# Patient Record
Sex: FEMALE | Race: WHITE | NOT HISPANIC OR LATINO | URBAN - NONMETROPOLITAN AREA
[De-identification: names, ages, dates, MRNs, and addresses within clinical notes are randomized per-mention and may not be internally consistent; named-entity substitution may affect disease eponyms.]

---

## 2019-08-12 ENCOUNTER — OFFICE VISIT (OUTPATIENT)
Dept: OBSTETRICS AND GYNECOLOGY | Facility: CLINIC | Age: 19
End: 2019-08-12

## 2019-08-12 VITALS
WEIGHT: 262 LBS | BODY MASS INDEX: 39.71 KG/M2 | SYSTOLIC BLOOD PRESSURE: 140 MMHG | HEIGHT: 68 IN | DIASTOLIC BLOOD PRESSURE: 80 MMHG

## 2019-08-12 DIAGNOSIS — Z83.3 FAMILY HISTORY OF DIABETES MELLITUS: ICD-10-CM

## 2019-08-12 DIAGNOSIS — N91.2 AMENORRHEA: Primary | ICD-10-CM

## 2019-08-12 DIAGNOSIS — L68.0 FEMALE HIRSUTISM: ICD-10-CM

## 2019-08-12 DIAGNOSIS — R63.5 WEIGHT GAIN: ICD-10-CM

## 2019-08-12 PROCEDURE — 99203 OFFICE O/P NEW LOW 30 MIN: CPT | Performed by: PHYSICIAN ASSISTANT

## 2019-08-12 NOTE — PROGRESS NOTES
Subjective   Chief Complaint   Patient presents with   • Menstrual Problem     No menses x 7 years   • Contraception     Would like to get birth control to help with having a periods       Veronica Gómez is a 18 y.o. year old new patient  presenting to be seen for complaints of amenorrhea and she is desiring to start birth control.  The patient is sexually active and states she has been consistent using condoms.  She reports menarche at age 10 or 11.  She never had regular menses.  After her first cycle she would skip several months before her next cycle sometimes up to 6 months in between her periods for the first 2 years.  She reports at this time she has not had a menstrual period in 7 years.  She has never had amenorrhea evaluated in the past.  She reports issues with hirsutism mainly her chin and jaws for the past 2 years.  She reports that she shaves her chin daily.  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  He does not have any significant issues with acne.  Reports there are no other women in her family that have had amenorrhea or hirsutism      History reviewed. No pertinent past medical history.   No current outpatient medications on file.   No Known Allergies   Past Surgical History:   Procedure Laterality Date   • NO PAST SURGERIES        Social History     Socioeconomic History   • Marital status: Single     Spouse name: Not on file   • Number of children: Not on file   • Years of education: Not on file   • Highest education level: Not on file   Tobacco Use   • Smoking status: Never Smoker   • Smokeless tobacco: Never Used   Substance and Sexual Activity   • Alcohol use: No     Frequency: Never   • Drug use: No   • Sexual activity: Yes     Partners: Male     Birth control/protection: Condom      Family History   Problem Relation Age of Onset   • Clotting disorder Father    • Diabetes Father    • Coronary artery disease Father    •  "Hypertension Father    • Hyperlipidemia Father    • No Known Problems Mother        Review of Systems   Constitutional:        Weight gain   Gastrointestinal: Negative.    Genitourinary: Positive for menstrual problem. Negative for difficulty urinating, dysuria, pelvic pain and vaginal discharge.   All other systems reviewed and are negative.          Objective   /80   Ht 172.7 cm (68\")   Wt 119 kg (262 lb)   Breastfeeding? No   BMI 39.84 kg/m²     Physical Exam   Constitutional: She is cooperative. No distress.   obese   Eyes: Conjunctivae, EOM and lids are normal.   Neck: No thyroid mass and no thyromegaly present.   Cardiovascular: Normal rate, regular rhythm and normal heart sounds.   Pulmonary/Chest: Effort normal and breath sounds normal.   Abdominal: Soft. Normal appearance and bowel sounds are normal. There is no hepatosplenomegaly. There is no tenderness. There is no rigidity and no guarding.   Genitourinary:   Genitourinary Comments: deferred   Neurological: She is alert.   Skin: Skin is warm and dry.   Moderate dark coarse hair chin and jawline  Moderate acanthosis nigricans posterior and sides of neck    Psychiatric: She has a normal mood and affect. Her behavior is normal.            Assessment and Plan  Veronica was seen today for menstrual problem and contraception.    Diagnoses and all orders for this visit:    Amenorrhea  -     Testosterone (Free & Total), LC / MS  -     Insulin, Total; Future  -     17-Hydroxyprogesterone  -     Comprehensive Metabolic Panel  -     DHEA-Sulfate  -     Estradiol  -     Follicle Stimulating Hormone  -     Hemoglobin A1c  -     Lipid Panel  -     Luteinizing Hormone  -     Prolactin  -     T4, Free  -     TSH  -     HCG, B-subunit, Quantitative ([facility] Only); Future    Female hirsutism  -     Testosterone (Free & Total), LC / MS  -     Insulin, Total; Future  -     17-Hydroxyprogesterone  -     Comprehensive Metabolic Panel  -     DHEA-Sulfate  -     Lipid " Panel  -     T4, Free  -     TSH    Weight gain  -     Testosterone (Free & Total), LC / MS  -     Insulin, Total; Future  -     17-Hydroxyprogesterone  -     Comprehensive Metabolic Panel  -     DHEA-Sulfate  -     Hemoglobin A1c  -     Lipid Panel  -     T4, Free  -     TSH    Family history of diabetes mellitus  -     Insulin, Total; Future  -     Comprehensive Metabolic Panel  -     Hemoglobin A1c  -     Lipid Panel      Patient Instructions   Will  Check labs today and RTO for pelvic ultrasound.  Pending results will discuss management and contraception options.             This note was electronically signed.    Megha Iqbal PA-C   August 12, 2019

## 2019-08-12 NOTE — PATIENT INSTRUCTIONS
Will  Check labs today and RTO for pelvic ultrasound.  Pending results will discuss management and contraception options.

## 2019-08-14 LAB
17OHP SERPL-MCNC: 56 NG/DL
ALBUMIN SERPL-MCNC: 4.7 G/DL (ref 3.5–5.2)
ALBUMIN/GLOB SERPL: 2.1 G/DL
ALP SERPL-CCNC: 51 U/L (ref 39–117)
ALT SERPL-CCNC: 23 U/L (ref 1–33)
AST SERPL-CCNC: 17 U/L (ref 1–32)
BILIRUB SERPL-MCNC: 0.3 MG/DL (ref 0.2–1.2)
BUN SERPL-MCNC: 12 MG/DL (ref 6–20)
BUN/CREAT SERPL: 13.6 (ref 7–25)
CALCIUM SERPL-MCNC: 9.6 MG/DL (ref 8.6–10.5)
CHLORIDE SERPL-SCNC: 103 MMOL/L (ref 98–107)
CHOLEST SERPL-MCNC: 195 MG/DL (ref 0–200)
CO2 SERPL-SCNC: 24.6 MMOL/L (ref 22–29)
CREAT SERPL-MCNC: 0.88 MG/DL (ref 0.57–1)
DHEA-S SERPL-MCNC: 152.8 UG/DL (ref 110–433.2)
ESTRADIOL SERPL-MCNC: 30.9 PG/ML
FSH SERPL-ACNC: 5.5 MIU/ML
GLOBULIN SER CALC-MCNC: 2.2 GM/DL
GLUCOSE SERPL-MCNC: 111 MG/DL (ref 65–99)
HBA1C MFR BLD: 6.2 % (ref 4.8–5.6)
HDLC SERPL-MCNC: 37 MG/DL (ref 40–60)
LDLC SERPL CALC-MCNC: 115 MG/DL (ref 0–100)
LH SERPL-ACNC: 8.5 MIU/ML
POTASSIUM SERPL-SCNC: 4.4 MMOL/L (ref 3.5–5.2)
PROLACTIN SERPL-MCNC: 16.3 NG/ML (ref 4.8–23.3)
PROT SERPL-MCNC: 6.9 G/DL (ref 6–8.5)
SODIUM SERPL-SCNC: 142 MMOL/L (ref 136–145)
T4 FREE SERPL-MCNC: 1.15 NG/DL (ref 0.93–1.7)
TESTOST FREE SERPL-MCNC: 2.6 PG/ML
TESTOST SERPL-MCNC: 46 NG/DL (ref 10–55)
TRIGL SERPL-MCNC: 217 MG/DL (ref 0–150)
TSH SERPL DL<=0.005 MIU/L-ACNC: 4.27 MIU/ML (ref 0.27–4.2)
VLDLC SERPL CALC-MCNC: 43.4 MG/DL

## 2019-08-17 ENCOUNTER — RESULTS ENCOUNTER (OUTPATIENT)
Dept: OBSTETRICS AND GYNECOLOGY | Facility: CLINIC | Age: 19
End: 2019-08-17

## 2019-08-17 DIAGNOSIS — Z83.3 FAMILY HISTORY OF DIABETES MELLITUS: ICD-10-CM

## 2019-08-17 DIAGNOSIS — L68.0 FEMALE HIRSUTISM: ICD-10-CM

## 2019-08-17 DIAGNOSIS — R63.5 WEIGHT GAIN: ICD-10-CM

## 2019-08-17 DIAGNOSIS — N91.2 AMENORRHEA: ICD-10-CM

## 2019-08-22 ENCOUNTER — OFFICE VISIT (OUTPATIENT)
Dept: OBSTETRICS AND GYNECOLOGY | Facility: CLINIC | Age: 19
End: 2019-08-22

## 2019-08-22 VITALS
WEIGHT: 258.4 LBS | SYSTOLIC BLOOD PRESSURE: 120 MMHG | BODY MASS INDEX: 39.16 KG/M2 | DIASTOLIC BLOOD PRESSURE: 82 MMHG | HEIGHT: 68 IN

## 2019-08-22 DIAGNOSIS — E28.2 POLYCYSTIC OVARY SYNDROME: ICD-10-CM

## 2019-08-22 DIAGNOSIS — N91.2 AMENORRHEA: Primary | ICD-10-CM

## 2019-08-22 DIAGNOSIS — E78.5 ELEVATED LIPIDS: ICD-10-CM

## 2019-08-22 DIAGNOSIS — R73.03 PREDIABETES: ICD-10-CM

## 2019-08-22 PROCEDURE — 99214 OFFICE O/P EST MOD 30 MIN: CPT | Performed by: PHYSICIAN ASSISTANT

## 2019-08-22 RX ORDER — MEDROXYPROGESTERONE ACETATE 10 MG/1
10 TABLET ORAL DAILY
Qty: 12 TABLET | Refills: 0 | Status: SHIPPED | OUTPATIENT
Start: 2019-08-22

## 2019-08-22 NOTE — PROGRESS NOTES
Subjective   Chief Complaint   Patient presents with   • Follow-up     TVS for amenorrhea, discuss labs       Veronica Gómez is a 18 y.o. year old  presenting to be seen for follow-up amenorrhea.  She was seen for her initial visit about 2 weeks ago and at that time had not had a menstrual period for 7 years.  She is sexually active and using condoms consistently.  Her menarche was at age 11-12 and she never had regular periods in her adolescence sometimes skipping periods for 5 to 6 months at a time.  She had complaints of weight gain and hirsutism as well.  Patient has had labs done which are reviewed with her and her mother today.  Her androgenic screening labs reveal a normal total testosterone at 46 and normal free testosterone at 2.6, 17 hydroxyprogesterone is normal, DHEAS is normal.  FSH is normal at 5.5 and LH is 8.5.  Prolactin is normal at 16.3.  Her TSH is minimally elevated at 4.270 and she has a normal free T4.  Lipids reveal moderately elevated triglycerides, slightly elevated LDL and low HDL.  Hemoglobin A1c is elevated at 6.2.  Comprehensive metabolic panel is normal except for glucose of 111 and the patient was fasting.  Pelvic ultrasound done on the patient today is also reviewed and it notes a normal-appearing anteverted uterus, her endometrium is 8.7 mm, bilateral ovaries have multiple small follicles and there is no free fluid.  Ultrasound images are reviewed by myself.  Of new significance today the patient's mother relates that the patient's biological father has had a blood clotting disorder that has caused him to have multiple embolus is in his heart and possible pulmonary embolus is.  The father also has a relative with another blood clotting disorder however the mother is not sure of the exact diagnoses of these issues.      History reviewed. No pertinent past medical history.     Current Outpatient Medications:   •  medroxyPROGESTERone (PROVERA) 10 MG tablet, Take 1 tablet by mouth  "Daily., Disp: 12 tablet, Rfl: 0  •  metFORMIN (GLUCOPHAGE) 500 MG tablet, Take 1 tablet by mouth with evening meal for one week, then Take 1 tablet twice a day with meals, Disp: 60 tablet, Rfl: 2   No Known Allergies   Past Surgical History:   Procedure Laterality Date   • NO PAST SURGERIES        Social History     Socioeconomic History   • Marital status: Single     Spouse name: Not on file   • Number of children: Not on file   • Years of education: Not on file   • Highest education level: Not on file   Tobacco Use   • Smoking status: Never Smoker   • Smokeless tobacco: Never Used   Substance and Sexual Activity   • Alcohol use: No     Frequency: Never   • Drug use: No   • Sexual activity: Yes     Partners: Male     Birth control/protection: Condom      Family History   Problem Relation Age of Onset   • Clotting disorder Father    • Diabetes Father    • Coronary artery disease Father    • Hypertension Father    • Hyperlipidemia Father    • No Known Problems Mother        Review of Systems   Constitutional:        Weight gain   Gastrointestinal: Negative.    Genitourinary: Positive for menstrual problem. Negative for difficulty urinating, dysuria and vaginal discharge.           Objective   /82   Ht 172.7 cm (68\")   Wt 117 kg (258 lb 6.4 oz)   Breastfeeding? No   BMI 39.29 kg/m²     Physical Exam         Assessment and Plan  Veronica was seen today for follow-up.    Diagnoses and all orders for this visit:    Amenorrhea  -     Cancel: US Pelvis Complete  -     US Non-OB Transvaginal    Prediabetes    Polycystic ovary syndrome    Elevated lipids    Other orders  -     medroxyPROGESTERone (PROVERA) 10 MG tablet; Take 1 tablet by mouth Daily.  -     metFORMIN (GLUCOPHAGE) 500 MG tablet; Take 1 tablet by mouth with evening meal for one week, then Take 1 tablet twice a day with meals      Patient Instructions   Given the new information today regarding the family history of coagulopathy the patient is advised " that at this point she should not be on any type of estrogen birth control for cycle regulation.  We will do a round of Provera for 12 days to try to get a withdrawal bleed.  We also discussed the significance of the elevated hemoglobin A1c and discussed the option of starting metformin at this time and the patient and mother would like to do this.  In addition to the metformin patient is also strongly encouraged to eliminate sugar, simple carbohydrates, and starches in her diet and try to implement a regular exercise regimen.  The patient will be going back to [place] today to [facility].  Patient does not anticipate that she will be back home very often.  She is advised that she should seek to establish care with a primary care practitioner in [place] to further follow-up on her prediabetes, she also will need repeat  thyroid testing in about 10 to 12 weeks.  She is advised with the family history of clotting disorders that she should try to find out the name of her father's issue and then she would need to be screened for coagulopathies prior to any estrogen containing birth control.             This note was electronically signed.    Megha Iqbal PA-C   August 23, 2019

## 2019-08-23 NOTE — PATIENT INSTRUCTIONS
Given the new information today regarding the family history of coagulopathy the patient is advised that at this point she should not be on any type of estrogen birth control for cycle regulation.  We will do a round of Provera for 12 days to try to get a withdrawal bleed.  We also discussed the significance of the elevated hemoglobin A1c and discussed the option of starting metformin at this time and the patient and mother would like to do this.  In addition to the metformin patient is also strongly encouraged to eliminate sugar, simple carbohydrates, and starches in her diet and try to implement a regular exercise regimen.  The patient will be going back to [place] today to [facility].  Patient does not anticipate that she will be back home very often.  She is advised that she should seek to establish care with a primary care practitioner in [place] to further follow-up on her prediabetes, she also will need repeat  thyroid testing in about 10 to 12 weeks.  She is advised with the family history of clotting disorders that she should try to find out the name of her father's issue and then she would need to be screened for coagulopathies prior to any estrogen containing birth control.

## 2020-01-09 NOTE — PROGRESS NOTES
Patient: Veronica Gómez    YOB: 2000    Date: 01/13/2020    Primary Care Provider: Paulette Layne APRN    Chief Complaint   Patient presents with   • Cyst     pilonidal cyst        SUBJECTIVE:    History of present illness:  Patient is here for evaluation of a pilonidal cyst. She complains of a history of pilonidal cysts. She states she is not having any symptoms currently but would like to discuss having a cystectomy. She states she has had multiple episodes of drainage and pain with her last episode being 3 weeks ago.     She has had multiple previous episodes of pilonidal inflammation that have spontaneously drained or gotten better with antibiotics.    The following portions of the patient's history were reviewed and updated as appropriate: allergies, current medications, past family history, past medical history, past social history, past surgical history and problem list.      Review of Systems   Constitutional: Negative for chills, fever and unexpected weight change.   HENT: Negative for hearing loss, trouble swallowing and voice change.    Eyes: Negative for visual disturbance.   Respiratory: Negative for apnea, cough, chest tightness, shortness of breath and wheezing.    Cardiovascular: Negative for chest pain, palpitations and leg swelling.   Gastrointestinal: Negative for abdominal distention, abdominal pain, anal bleeding, blood in stool, constipation, diarrhea, nausea, rectal pain and vomiting.   Endocrine: Negative for cold intolerance and heat intolerance.   Genitourinary: Negative for difficulty urinating, dysuria and flank pain.   Musculoskeletal: Negative for back pain and gait problem.   Skin: Negative for color change, rash and wound.   Neurological: Negative for dizziness, syncope, speech difficulty, weakness, light-headedness, numbness and headaches.   Hematological: Negative for adenopathy. Does not bruise/bleed easily.   Psychiatric/Behavioral: Negative for confusion. The  "patient is not nervous/anxious.        Allergies:  No Known Allergies    Medications:    Current Outpatient Medications:   •  medroxyPROGESTERone (PROVERA) 10 MG tablet, Take 1 tablet by mouth Daily., Disp: 12 tablet, Rfl: 0  •  metFORMIN (GLUCOPHAGE) 500 MG tablet, Take 1 tablet by mouth with evening meal for one week, then Take 1 tablet twice a day with meals, Disp: 60 tablet, Rfl: 2    History:  History reviewed. No pertinent past medical history.    Past Surgical History:   Procedure Laterality Date   • NO PAST SURGERIES         Family History   Problem Relation Age of Onset   • Clotting disorder Father    • Diabetes Father    • Coronary artery disease Father    • Hypertension Father    • Hyperlipidemia Father    • No Known Problems Mother        Social History     Tobacco Use   • Smoking status: Never Smoker   • Smokeless tobacco: Never Used   Substance Use Topics   • Alcohol use: No     Frequency: Never   • Drug use: No        OBJECTIVE:    Vital Signs:   Vitals:    01/13/20 0928   BP: 122/84   Temp: 97.7 °F (36.5 °C)   Weight: 117 kg (258 lb)   Height: 172.7 cm (67.99\")       Physical Exam:   General Appearance:    Alert, cooperative, in no acute distress   Head:    Normocephalic, without obvious abnormality, atraumatic   Eyes:            Lids and lashes normal, conjunctivae and sclerae normal, no   icterus, no pallor, corneas clear, PERRLA   Ears:    Ears appear intact with no abnormalities noted   Throat:   No oral lesions, no thrush, oral mucosa moist   Neck:   No adenopathy, supple, trachea midline, no thyromegaly, no   carotid bruit, no JVD   Lungs:     Clear to auscultation,respirations regular, even and                  unlabored    Heart:    Regular rhythm and normal rate, normal S1 and S2, no            murmur, no gallop, no rub, no click   Chest Wall:    No abnormalities observed   Abdomen:     Normal bowel sounds, no masses, no organomegaly, soft        non-tender, non-distended, no guarding, no " rebound                tenderness   Extremities:   Moves all extremities well, no edema, no cyanosis, no             redness   Pulses:   Pulses palpable and equal bilaterally   Skin:   No bleeding, bruising or rash evidence of midline pilonidal sinuses but no evidence of abscess,    Lymph nodes:   No palpable adenopathy   Neurologic:   Cranial nerves 2 - 12 grossly intact, sensation intact, DTR       present and equal bilaterally     Results Review:   I reviewed the patient's new clinical results.  I reviewed the patient's new imaging results and agree with the interpretation.  I reviewed the patient's other test results and agree with the interpretation    Review of Systems was reviewed and confirmed as accurate as documented by the MA.    ASSESSMENT/PLAN:    1. Pilonidal cyst        I did have a detailed and extensive discussion with the patient and her mother in the office today, she did will need to undergo future pilonidal cystectomy but she wants to wait until she is home for spring break.  She will come back and see me prior to this.    I discussed the patients findings and my recommendations with patient and family        Electronically signed by Yoni Palacios MD  01/21/20      .    Portions of this note has been scribed for Yoni Palacios MD by Chanelle Singh. 1/21/2020  4:13 PM

## 2020-01-13 ENCOUNTER — OFFICE VISIT (OUTPATIENT)
Dept: SURGERY | Facility: CLINIC | Age: 20
End: 2020-01-13

## 2020-01-13 VITALS
BODY MASS INDEX: 39.1 KG/M2 | TEMPERATURE: 97.7 F | HEIGHT: 68 IN | WEIGHT: 258 LBS | SYSTOLIC BLOOD PRESSURE: 122 MMHG | DIASTOLIC BLOOD PRESSURE: 84 MMHG

## 2020-01-13 DIAGNOSIS — L05.91 PILONIDAL CYST: Primary | ICD-10-CM

## 2020-01-13 PROCEDURE — 99243 OFF/OP CNSLTJ NEW/EST LOW 30: CPT | Performed by: SURGERY

## 2020-05-01 NOTE — PROGRESS NOTES
"Patient: Veronica Gómez    YOB: 2000    Date: 05/04/2020    Primary Care Provider: Paulette Layne APRN    Chief Complaint   Patient presents with   • Follow-up     pilonidal cyst.       SUBJECTIVE:    History of present illness:  Patient has a history of pilonidal cyst, she has had multiple episodes of spontaneous rupture with drainage.  Patient is here for follow-up.  Patient stated \"it is all healed-up right now.\"  Patient denies pain, she denies swelling and she denies redness or drainage at the site.    The patient has had multiple previous attacks of drainage from pilonidal cyst abscess, currently she is asymptomatic.    The following portions of the patient's history were reviewed and updated as appropriate: allergies, current medications, past family history, past medical history, past social history, past surgical history and problem list.      Review of Systems   Constitutional: Negative for chills, fever and unexpected weight change.   HENT: Negative for hearing loss, trouble swallowing and voice change.    Eyes: Negative for visual disturbance.   Respiratory: Negative for apnea, cough, chest tightness, shortness of breath and wheezing.    Cardiovascular: Negative for chest pain, palpitations and leg swelling.   Gastrointestinal: Negative for abdominal distention, abdominal pain, anal bleeding, blood in stool, constipation, diarrhea, nausea, rectal pain and vomiting.   Endocrine: Negative for cold intolerance and heat intolerance.   Genitourinary: Negative for difficulty urinating, dysuria and flank pain.   Musculoskeletal: Negative for back pain and gait problem.   Skin: Negative for color change, rash and wound.   Neurological: Negative for dizziness, syncope, speech difficulty, weakness, light-headedness, numbness and headaches.   Hematological: Negative for adenopathy. Does not bruise/bleed easily.   Psychiatric/Behavioral: Negative for confusion. The patient is not nervous/anxious. " "       Allergies:  No Known Allergies    Medications:    Current Outpatient Medications:   •  medroxyPROGESTERone (PROVERA) 10 MG tablet, Take 1 tablet by mouth Daily., Disp: 12 tablet, Rfl: 0  •  metFORMIN (GLUCOPHAGE) 500 MG tablet, Take 1 tablet by mouth with evening meal for one week, then Take 1 tablet twice a day with meals, Disp: 60 tablet, Rfl: 2    History:  History reviewed. No pertinent past medical history.    Past Surgical History:   Procedure Laterality Date   • NO PAST SURGERIES         Family History   Problem Relation Age of Onset   • Clotting disorder Father    • Diabetes Father    • Coronary artery disease Father    • Hypertension Father    • Hyperlipidemia Father    • No Known Problems Mother        Social History     Tobacco Use   • Smoking status: Never Smoker   • Smokeless tobacco: Never Used   Substance Use Topics   • Alcohol use: No     Frequency: Never   • Drug use: No        OBJECTIVE:    Vital Signs:   Vitals:    05/04/20 1036   BP: 118/82   Pulse: 92   Resp: 18   Temp: 97.6 °F (36.4 °C)   TempSrc: Temporal   SpO2: 98%   Weight: 117 kg (258 lb)   Height: 172.7 cm (68\")       Physical Exam:   General Appearance:    Alert, cooperative, in no acute distress   Head:    Normocephalic, without obvious abnormality, atraumatic   Eyes:            Lids and lashes normal, conjunctivae and sclerae normal, no   icterus, no pallor, corneas clear, PERRLA   Ears:    Ears appear intact with no abnormalities noted   Throat:   No oral lesions, no thrush, oral mucosa moist   Neck:   No adenopathy, supple, trachea midline, no thyromegaly, no   carotid bruit, no JVD   Lungs:     Clear to auscultation,respirations regular, even and                  unlabored    Heart:    Regular rhythm and normal rate, normal S1 and S2, no            murmur, no gallop, no rub, no click   Chest Wall:    No abnormalities observed   Abdomen:     Normal bowel sounds, no masses, no organomegaly, soft        non-tender, " non-distended, no guarding, no rebound                tenderness   Extremities:   Moves all extremities well, no edema, no cyanosis, no             redness   Pulses:   Pulses palpable and equal bilaterally   Skin:   No bleeding, bruising or rash, there is evidence of old pilonidal cyst with midline pits in the presacral region, no evidence of abscess   Lymph nodes:   No palpable adenopathy   Neurologic:   Cranial nerves 2 - 12 grossly intact, sensation intact, DTR       present and equal bilaterally     Results Review:   I reviewed the patient's new clinical results.  I reviewed the patient's new imaging results and agree with the interpretation.  I reviewed the patient's other test results and agree with the interpretation    Review of Systems was reviewed and confirmed as accurate as documented by the MA.    ASSESSMENT/PLAN:    1. Pilonidal cyst        In short, I did have a detailed and extensive discussion with the patient in the office today and she does need to undergo pilonidal cyst excision.  Risk and benefits of operative versus nonoperative intervention have been discussed with the patient including the possibility of recurrence.  She understands, agrees, and wished to proceed with the above-mentioned surgical treatment plan while she is currently not that symptomatic.    I discussed the patients findings and my recommendations with patient.        Electronically signed by Yoni Palacios MD  05/04/20        Portions of this note have been scribed for Yoni Palacios MD by Deloris David. 5/4/2020  10:50

## 2020-05-04 ENCOUNTER — OFFICE VISIT (OUTPATIENT)
Dept: SURGERY | Facility: CLINIC | Age: 20
End: 2020-05-04

## 2020-05-04 VITALS
TEMPERATURE: 97.6 F | HEART RATE: 92 BPM | WEIGHT: 258 LBS | OXYGEN SATURATION: 98 % | SYSTOLIC BLOOD PRESSURE: 118 MMHG | DIASTOLIC BLOOD PRESSURE: 82 MMHG | RESPIRATION RATE: 18 BRPM | HEIGHT: 68 IN | BODY MASS INDEX: 39.1 KG/M2

## 2020-05-04 DIAGNOSIS — Z01.818 PRE-OP TESTING: Primary | ICD-10-CM

## 2020-05-04 DIAGNOSIS — L05.91 PILONIDAL CYST: Primary | ICD-10-CM

## 2020-05-04 PROCEDURE — 99213 OFFICE O/P EST LOW 20 MIN: CPT | Performed by: SURGERY

## 2020-05-15 ENCOUNTER — APPOINTMENT (OUTPATIENT)
Dept: PREADMISSION TESTING | Facility: HOSPITAL | Age: 20
End: 2020-05-15

## 2020-05-15 VITALS — BODY MASS INDEX: 41 KG/M2 | WEIGHT: 270.5 LBS | HEIGHT: 68 IN

## 2020-05-15 DIAGNOSIS — Z01.818 PRE-OP TESTING: ICD-10-CM

## 2020-05-15 LAB
ANION GAP SERPL CALCULATED.3IONS-SCNC: 12.6 MMOL/L (ref 5–15)
B-HCG UR QL: NEGATIVE
BUN BLD-MCNC: 11 MG/DL (ref 6–20)
BUN/CREAT SERPL: 13.8 (ref 7–25)
CALCIUM SPEC-SCNC: 9.9 MG/DL (ref 8.6–10.5)
CHLORIDE SERPL-SCNC: 104 MMOL/L (ref 98–107)
CO2 SERPL-SCNC: 24.4 MMOL/L (ref 22–29)
CREAT BLD-MCNC: 0.8 MG/DL (ref 0.57–1)
DEPRECATED RDW RBC AUTO: 38.5 FL (ref 37–54)
ERYTHROCYTE [DISTWIDTH] IN BLOOD BY AUTOMATED COUNT: 12.3 % (ref 12.3–15.4)
GFR SERPL CREATININE-BSD FRML MDRD: 111 ML/MIN/1.73
GLUCOSE BLD-MCNC: 132 MG/DL (ref 65–99)
HCT VFR BLD AUTO: 44.5 % (ref 34–46.6)
HGB BLD-MCNC: 14.4 G/DL (ref 12–15.9)
MCH RBC QN AUTO: 27.7 PG (ref 26.6–33)
MCHC RBC AUTO-ENTMCNC: 32.4 G/DL (ref 31.5–35.7)
MCV RBC AUTO: 85.6 FL (ref 79–97)
PLATELET # BLD AUTO: 299 10*3/MM3 (ref 140–450)
PMV BLD AUTO: 10.8 FL (ref 6–12)
POTASSIUM BLD-SCNC: 4.2 MMOL/L (ref 3.5–5.2)
RBC # BLD AUTO: 5.2 10*6/MM3 (ref 3.77–5.28)
SODIUM BLD-SCNC: 141 MMOL/L (ref 136–145)
WBC NRBC COR # BLD: 10.34 10*3/MM3 (ref 3.4–10.8)

## 2020-05-15 PROCEDURE — 36415 COLL VENOUS BLD VENIPUNCTURE: CPT

## 2020-05-15 PROCEDURE — C9803 HOPD COVID-19 SPEC COLLECT: HCPCS

## 2020-05-15 PROCEDURE — 85027 COMPLETE CBC AUTOMATED: CPT | Performed by: SURGERY

## 2020-05-15 PROCEDURE — 80048 BASIC METABOLIC PNL TOTAL CA: CPT | Performed by: SURGERY

## 2020-05-15 PROCEDURE — 81025 URINE PREGNANCY TEST: CPT | Performed by: SURGERY

## 2020-05-15 PROCEDURE — U0002 COVID-19 LAB TEST NON-CDC: HCPCS | Performed by: SURGERY

## 2020-05-15 PROCEDURE — U0004 COV-19 TEST NON-CDC HGH THRU: HCPCS | Performed by: SURGERY

## 2020-05-15 NOTE — DISCHARGE INSTRUCTIONS
PAT PASS GIVEN/REVIEWED WITH PT.  VERBALIZED UNDERSTANDING OF THE FOLLOWING:  DO NOT EAT, DRINK, SMOKE, USE SMOKELESS TOBACCO OR CHEW GUM AFTER MIDNIGHT THE NIGHT BEFORE SURGERY.  THIS ALSO INCLUDES HARD CANDIES AND MINTS.    DO NOT SHAVE THE AREA TO BE OPERATED ON AT LEAST 48 HOURS PRIOR TO THE PROCEDURE.  DO NOT WEAR MAKE UP OR NAIL POLISH.  DO NOT LEAVE IN ANY PIERCING OR WEAR JEWELRY THE DAY OF SURGERY.      DO NOT USE ADHESIVES IF YOU WEAR DENTURES.    DO NOT WEAR EYE CONTACTS; BRING IN YOUR GLASSES.    ONLY TAKE MEDICATION THE MORNING OF YOUR PROCEDURE IF INSTRUCTED BY YOUR SURGEON WITH ENOUGH WATER TO SWALLOW THE MEDICATION.  IF YOUR SURGEON DID NOT SPECIFY WHICH MEDICATIONS TO TAKE, YOU WILL NEED TO CALL THEIR OFFICE FOR FURTHER INSTRUCTIONS AND DO AS THEY INSTRUCT.    LEAVE ANYTHING YOU CONSIDER VALUABLE AT HOME.    YOU WILL NEED TO ARRANGE FOR SOMEONE TO DRIVE YOU HOME AFTER SURGERY.  IT IS RECOMMENDED THAT YOU DO NOT DRIVE, WORK, DRINK ALCOHOL OR MAKE MAJOR DECISIONS FOR AT LEAST 24 HOURS AFTER YOUR PROCEDURE IS COMPLETE.      THE DAY OF YOUR PROCEDURE, BRING IN THE FOLLOWING IF APPLICABLE:   PICTURE ID AND INSURANCE/MEDICARE OR MEDICAID CARDS   COPY OF ADVANCED DIRECTIVE/LIVING WILL/POWER OR    CPAP/BIPAP/INHALERS   SKIN PREP SHEET   YOUR PREADMISSION TESTING PASS (IF NOT A PHONE HISTORY)        Chlorhexidine wipes along with instruction/verification sheet given to pt.  Instructed pt to apply stickers from chlorhexidine wipes to verification sheet once skin prep has been  completed, and to return to [facility] the day of the procedure.  Pt. Verbalizes understanding.      PAT patient education COVID testing pre-op instructions reviewed with pt.  Verbalized understanding.

## 2020-05-16 LAB
REF LAB TEST METHOD: NORMAL
SARS-COV-2 RNA RESP QL NAA+PROBE: NOT DETECTED

## 2020-05-18 ENCOUNTER — TELEPHONE (OUTPATIENT)
Dept: PREADMISSION TESTING | Facility: HOSPITAL | Age: 20
End: 2020-05-18

## 2020-05-20 ENCOUNTER — ANESTHESIA (OUTPATIENT)
Dept: PERIOP | Facility: HOSPITAL | Age: 20
End: 2020-05-20

## 2020-05-20 ENCOUNTER — HOSPITAL ENCOUNTER (OUTPATIENT)
Facility: HOSPITAL | Age: 20
Setting detail: HOSPITAL OUTPATIENT SURGERY
Discharge: HOME | End: 2020-05-20
Attending: SURGERY | Admitting: SURGERY

## 2020-05-20 ENCOUNTER — ANESTHESIA EVENT (OUTPATIENT)
Dept: PERIOP | Facility: HOSPITAL | Age: 20
End: 2020-05-20

## 2020-05-20 VITALS
TEMPERATURE: 98.1 F | DIASTOLIC BLOOD PRESSURE: 67 MMHG | HEART RATE: 90 BPM | OXYGEN SATURATION: 95 % | SYSTOLIC BLOOD PRESSURE: 120 MMHG | RESPIRATION RATE: 14 BRPM

## 2020-05-20 DIAGNOSIS — L05.91 PILONIDAL CYST: ICD-10-CM

## 2020-05-20 PROCEDURE — 25010000002 DEXAMETHASONE PER 1 MG: Performed by: NURSE ANESTHETIST, CERTIFIED REGISTERED

## 2020-05-20 PROCEDURE — 25010000002 ONDANSETRON PER 1 MG: Performed by: NURSE ANESTHETIST, CERTIFIED REGISTERED

## 2020-05-20 PROCEDURE — 11772 EXC PILONIDAL CYST COMP: CPT | Performed by: SURGERY

## 2020-05-20 PROCEDURE — 94799 UNLISTED PULMONARY SVC/PX: CPT

## 2020-05-20 PROCEDURE — 25010000002 PROPOFOL 200 MG/20ML EMULSION: Performed by: NURSE ANESTHETIST, CERTIFIED REGISTERED

## 2020-05-20 PROCEDURE — 25010000003 CEFAZOLIN PER 500 MG: Performed by: SURGERY

## 2020-05-20 PROCEDURE — 25010000002 SUCCINYLCHOLINE PER 20 MG: Performed by: NURSE ANESTHETIST, CERTIFIED REGISTERED

## 2020-05-20 PROCEDURE — 25010000002 HYDROMORPHONE PER 4 MG: Performed by: NURSE ANESTHETIST, CERTIFIED REGISTERED

## 2020-05-20 PROCEDURE — 25010000002 FENTANYL CITRATE (PF) 100 MCG/2ML SOLUTION: Performed by: NURSE ANESTHETIST, CERTIFIED REGISTERED

## 2020-05-20 PROCEDURE — 25010000002 KETOROLAC TROMETHAMINE PER 15 MG: Performed by: NURSE ANESTHETIST, CERTIFIED REGISTERED

## 2020-05-20 RX ORDER — HYDROMORPHONE HCL 110MG/55ML
PATIENT CONTROLLED ANALGESIA SYRINGE INTRAVENOUS AS NEEDED
Status: DISCONTINUED | OUTPATIENT
Start: 2020-05-20 | End: 2020-05-20 | Stop reason: SURG

## 2020-05-20 RX ORDER — SUCCINYLCHOLINE CHLORIDE 20 MG/ML
INJECTION INTRAMUSCULAR; INTRAVENOUS AS NEEDED
Status: DISCONTINUED | OUTPATIENT
Start: 2020-05-20 | End: 2020-05-20 | Stop reason: SURG

## 2020-05-20 RX ORDER — LIDOCAINE HYDROCHLORIDE 20 MG/ML
INJECTION, SOLUTION INTRAVENOUS AS NEEDED
Status: DISCONTINUED | OUTPATIENT
Start: 2020-05-20 | End: 2020-05-20 | Stop reason: SURG

## 2020-05-20 RX ORDER — ONDANSETRON 4 MG/1
4 TABLET, FILM COATED ORAL ONCE AS NEEDED
Status: DISCONTINUED | OUTPATIENT
Start: 2020-05-20 | End: 2020-05-20 | Stop reason: HOSPADM

## 2020-05-20 RX ORDER — SODIUM CHLORIDE 0.9 % (FLUSH) 0.9 %
10 SYRINGE (ML) INJECTION AS NEEDED
Status: DISCONTINUED | OUTPATIENT
Start: 2020-05-20 | End: 2020-05-20 | Stop reason: HOSPADM

## 2020-05-20 RX ORDER — SODIUM CHLORIDE, SODIUM LACTATE, POTASSIUM CHLORIDE, CALCIUM CHLORIDE 600; 310; 30; 20 MG/100ML; MG/100ML; MG/100ML; MG/100ML
1000 INJECTION, SOLUTION INTRAVENOUS CONTINUOUS
Status: DISCONTINUED | OUTPATIENT
Start: 2020-05-20 | End: 2020-05-20 | Stop reason: HOSPADM

## 2020-05-20 RX ORDER — BUPIVACAINE HYDROCHLORIDE 5 MG/ML
INJECTION, SOLUTION EPIDURAL; INTRACAUDAL AS NEEDED
Status: DISCONTINUED | OUTPATIENT
Start: 2020-05-20 | End: 2020-05-20 | Stop reason: HOSPADM

## 2020-05-20 RX ORDER — MAGNESIUM HYDROXIDE 1200 MG/15ML
LIQUID ORAL AS NEEDED
Status: DISCONTINUED | OUTPATIENT
Start: 2020-05-20 | End: 2020-05-20 | Stop reason: HOSPADM

## 2020-05-20 RX ORDER — IBUPROFEN 600 MG/1
600 TABLET ORAL EVERY 6 HOURS PRN
Status: DISCONTINUED | OUTPATIENT
Start: 2020-05-20 | End: 2020-05-20 | Stop reason: HOSPADM

## 2020-05-20 RX ORDER — KETOROLAC TROMETHAMINE 30 MG/ML
INJECTION, SOLUTION INTRAMUSCULAR; INTRAVENOUS AS NEEDED
Status: DISCONTINUED | OUTPATIENT
Start: 2020-05-20 | End: 2020-05-20 | Stop reason: SURG

## 2020-05-20 RX ORDER — HYDROCODONE BITARTRATE AND ACETAMINOPHEN 7.5; 325 MG/1; MG/1
1 TABLET ORAL EVERY 6 HOURS PRN
Qty: 20 TABLET | Refills: 0 | Status: SHIPPED | OUTPATIENT
Start: 2020-05-20

## 2020-05-20 RX ORDER — PROPOFOL 10 MG/ML
INJECTION, EMULSION INTRAVENOUS AS NEEDED
Status: DISCONTINUED | OUTPATIENT
Start: 2020-05-20 | End: 2020-05-20 | Stop reason: SURG

## 2020-05-20 RX ORDER — FENTANYL CITRATE 50 UG/ML
INJECTION, SOLUTION INTRAMUSCULAR; INTRAVENOUS AS NEEDED
Status: DISCONTINUED | OUTPATIENT
Start: 2020-05-20 | End: 2020-05-20 | Stop reason: SURG

## 2020-05-20 RX ORDER — ONDANSETRON 2 MG/ML
INJECTION INTRAMUSCULAR; INTRAVENOUS AS NEEDED
Status: DISCONTINUED | OUTPATIENT
Start: 2020-05-20 | End: 2020-05-20 | Stop reason: SURG

## 2020-05-20 RX ORDER — ONDANSETRON 2 MG/ML
4 INJECTION INTRAMUSCULAR; INTRAVENOUS ONCE AS NEEDED
Status: DISCONTINUED | OUTPATIENT
Start: 2020-05-20 | End: 2020-05-20 | Stop reason: HOSPADM

## 2020-05-20 RX ORDER — PROMETHAZINE HYDROCHLORIDE 25 MG/ML
12.5 INJECTION, SOLUTION INTRAMUSCULAR; INTRAVENOUS ONCE AS NEEDED
Status: DISCONTINUED | OUTPATIENT
Start: 2020-05-20 | End: 2020-05-20 | Stop reason: HOSPADM

## 2020-05-20 RX ORDER — DEXAMETHASONE SODIUM PHOSPHATE 4 MG/ML
INJECTION, SOLUTION INTRA-ARTICULAR; INTRALESIONAL; INTRAMUSCULAR; INTRAVENOUS; SOFT TISSUE AS NEEDED
Status: DISCONTINUED | OUTPATIENT
Start: 2020-05-20 | End: 2020-05-20 | Stop reason: SURG

## 2020-05-20 RX ORDER — KETAMINE HYDROCHLORIDE 50 MG/ML
INJECTION, SOLUTION, CONCENTRATE INTRAMUSCULAR; INTRAVENOUS AS NEEDED
Status: DISCONTINUED | OUTPATIENT
Start: 2020-05-20 | End: 2020-05-20 | Stop reason: SURG

## 2020-05-20 RX ORDER — BUPIVACAINE HYDROCHLORIDE AND EPINEPHRINE 5; 5 MG/ML; UG/ML
INJECTION, SOLUTION EPIDURAL; INTRACAUDAL; PERINEURAL AS NEEDED
Status: DISCONTINUED | OUTPATIENT
Start: 2020-05-20 | End: 2020-05-20 | Stop reason: HOSPADM

## 2020-05-20 RX ADMIN — SODIUM CHLORIDE, POTASSIUM CHLORIDE, SODIUM LACTATE AND CALCIUM CHLORIDE 1000 ML: 600; 310; 30; 20 INJECTION, SOLUTION INTRAVENOUS at 07:45

## 2020-05-20 RX ADMIN — KETOROLAC TROMETHAMINE 15 MG: 30 INJECTION, SOLUTION INTRAMUSCULAR at 10:28

## 2020-05-20 RX ADMIN — DEXAMETHASONE SODIUM PHOSPHATE 8 MG: 4 INJECTION, SOLUTION INTRAMUSCULAR; INTRAVENOUS at 09:48

## 2020-05-20 RX ADMIN — PROPOFOL 200 MG: 10 INJECTION, EMULSION INTRAVENOUS at 09:44

## 2020-05-20 RX ADMIN — ONDANSETRON 4 MG: 2 INJECTION INTRAMUSCULAR; INTRAVENOUS at 09:48

## 2020-05-20 RX ADMIN — SUCCINYLCHOLINE CHLORIDE 100 MG: 20 INJECTION, SOLUTION INTRAMUSCULAR; INTRAVENOUS at 09:48

## 2020-05-20 RX ADMIN — FENTANYL CITRATE 100 MCG: 50 INJECTION INTRAMUSCULAR; INTRAVENOUS at 09:53

## 2020-05-20 RX ADMIN — KETOROLAC TROMETHAMINE 15 MG: 30 INJECTION, SOLUTION INTRAMUSCULAR at 09:55

## 2020-05-20 RX ADMIN — HYDROMORPHONE HYDROCHLORIDE 0.5 MG: 2 INJECTION, SOLUTION INTRAMUSCULAR; INTRAVENOUS; SUBCUTANEOUS at 09:48

## 2020-05-20 RX ADMIN — KETAMINE HYDROCHLORIDE 25 MG: 50 INJECTION, SOLUTION INTRAMUSCULAR; INTRAVENOUS at 09:48

## 2020-05-20 RX ADMIN — LIDOCAINE HYDROCHLORIDE 100 MG: 20 INJECTION, SOLUTION INTRAVENOUS at 09:47

## 2020-05-20 RX ADMIN — CEFAZOLIN 3 MG: 1 INJECTION, POWDER, FOR SOLUTION INTRAVENOUS at 09:52

## 2020-05-20 NOTE — DISCHARGE INSTRUCTIONS
Please follow all post op instructions and follow up appointment time from your physician's office included in your discharge packet.    Wash wound BID with soap and water  Wash wound with soap and water after bowel movement  Ice to wound x 24 hours    Place ice pack to the wound for the first 24-48 hours, 30 minutes on and 10 minutes off     .   No pushing, pulling, tugging,  heavy lifting, or strenuous activity.  No major decision making, driving, or drinking alcoholic beverages for 24 hours. ( due to the medications you have  received)  Always use good hand hygiene/washing techniques.  NO driving while taking pain medications.    * if you have an incision:  Check your incision area every day for signs of infection.   Check for:  * more redness, swelling, or pain  *more fluid or blood  *warmth  *pus or bad smell    To assist you in voiding:  Drink plenty of fluids  Listen to running water while attempting to void.    If you are unable to urinate and you have an uncomfortable urge to void or it has been   6 hours since you were discharged, return to the Emergency Room

## 2020-05-20 NOTE — ANESTHESIA POSTPROCEDURE EVALUATION
Patient: Veronica Gómez    Procedure Summary     Date:  05/20/20 Room / Location:  [facility] OR 4 /  [facility]    Anesthesia Start:  0931 Anesthesia Stop:  1028    Procedure:  PILONIDAL CYSTECTOMY (N/A Back) Diagnosis:       Pilonidal cyst      (Pilonidal cyst [L05.91])    Surgeon:  Yoni Palacios MD Provider:  Julius Perales CRNA    Anesthesia Type:  spinal ASA Status:  2          Anesthesia Type: spinal    Vitals  Vitals Value Taken Time   /67 5/20/2020 12:00 PM   Temp 97.5 °F (36.4 °C) 5/20/2020 12:00 PM   Pulse 86 5/20/2020 12:01 PM   Resp 14 5/20/2020 12:00 PM   SpO2 93 % 5/20/2020 12:01 PM   Vitals shown include unvalidated device data.        Post Anesthesia Care and Evaluation    Patient location during evaluation: PACU  Patient participation: complete - patient participated  Level of consciousness: awake  Pain score: 0  Pain management: satisfactory to patient  Airway patency: patent  Anesthetic complications: No anesthetic complications  PONV Status: none  Cardiovascular status: acceptable and hemodynamically stable  Respiratory status: acceptable  Hydration status: acceptable

## 2020-05-20 NOTE — ANESTHESIA PREPROCEDURE EVALUATION
Anesthesia Evaluation     Patient summary reviewed and Nursing notes reviewed   no history of anesthetic complications:  NPO Solid Status: > 8 hours  NPO Liquid Status: > 8 hours           Airway   Mallampati: I  TM distance: >3 FB  Neck ROM: full  no difficulty expected  Dental - normal exam     Pulmonary - negative pulmonary ROS and normal exam   Cardiovascular - negative cardio ROS and normal exam    Rhythm: regular  Rate: normal        Neuro/Psych- negative ROS  GI/Hepatic/Renal/Endo    (+) morbid obesity,      Musculoskeletal (-) negative ROS    Abdominal  - normal exam    Abdomen: soft.  Bowel sounds: normal.   Substance History - negative use     OB/GYN negative ob/gyn ROS         Other        ROS/Med Hx Other: PCOS    -Covid 19 test                Anesthesia Plan    ASA 2     spinal   (Risks discussed , including but not limited to:  Headache, itching, n&v, infection, failure, decreased blood pressure, permanent/chronic back pain, nerve damage, paralysis, etc. All questions answered and informed consent obtained. )  intravenous induction     Anesthetic plan, all risks, benefits, and alternatives have been provided, discussed and informed consent has been obtained with: patient.    Plan discussed with CRNA.

## 2020-05-20 NOTE — OP NOTE
PATIENT:    Veronica Gómez    DATE OF SURGERY:  [unfilled]     PHYSICIAN:    Yoni Palacios MD    REFERRING PHYSICIAN:   Yoni Palacios MD    YOB: 2000     PREOPERATIVE DIAGNOSIS:  Pilonidal Cyst    POSTOPERATIVE DIAGNOSIS:  Pilonidal Cyst    PROCEDURE:  Pilonidal cystectomy complicated    OPERATIVE PROCEDURE:  The patient was taken to the operating room in the normal manner.  I did speak with the patient today and marked them accordingly.  An appropriate timeout was performed intraoperatively prior to the incision.  Preoperative IV antibiotics were given.  The patient was prepped and draped in a normal sterile fashion after being placed in the prone position.  General endotracheal anesthesia was given first.     The patient did have midline pits and sinus tracts consistent with pilonidal cysts and an elliptical midline was made around these pits.  This was sharply dissected down to the sacrum itself and Bovie electrocautery was used for hemostasis.  The entire cavity and tracts were excised in the normal manner, and the specimen was sent to pathology.  This was complicated in nature due to the size of the lesion and the number of tracts.    The wound was copiously irrigated and Bovie electrocautery was used for hemostasis.  The wound was closed in interrupted fashion with 3-0 Vicryl suture and then interrupted 3-0 nylon vertical mattress sutures.  It was injected with a Marcaine/epinephrine mixture.  The patient was stable at this point in time and subsequently transferred back to the recovery room in stable condition.      Yoni Palacios MD, FACS

## 2020-05-20 NOTE — SIGNIFICANT NOTE
"1025- bed linens wet, appears to have had incontinence of urine. Chucks placed to keep skin dry. Unable to contact pt,s mom \" Brianne\" ph call goes to answering, no message left.  "

## 2020-05-23 LAB
LAB AP CASE REPORT: NORMAL
PATH REPORT.FINAL DX SPEC: NORMAL

## 2020-06-03 ENCOUNTER — OFFICE VISIT (OUTPATIENT)
Dept: SURGERY | Facility: CLINIC | Age: 20
End: 2020-06-03

## 2020-06-03 VITALS
OXYGEN SATURATION: 98 % | WEIGHT: 264.4 LBS | BODY MASS INDEX: 40.07 KG/M2 | SYSTOLIC BLOOD PRESSURE: 130 MMHG | TEMPERATURE: 97.5 F | HEART RATE: 98 BPM | HEIGHT: 68 IN | DIASTOLIC BLOOD PRESSURE: 88 MMHG

## 2020-06-03 DIAGNOSIS — Z48.89 POSTOPERATIVE VISIT: Primary | ICD-10-CM

## 2020-06-03 PROCEDURE — 99024 POSTOP FOLLOW-UP VISIT: CPT | Performed by: SURGERY

## 2020-06-03 NOTE — PROGRESS NOTES
"Patient: Veronica Gómez    YOB: 2000    Date: 06/03/2020    Primary Care Provider: Paulette Layne APRN    Chief Complaint   Patient presents with   • Post-op     stevie cyst       History of present illness:  I saw the patient in the office today as a followup from their recent excision of pilonidal cyst which was done on 05/20/2020, the pathology report did show benign skin and underlying fibroadipose tissue with findings suggestive of cyst rupture..  They state that they have done well and are having no problems.    Vital Signs:  Vitals:    06/03/20 1328   BP: 130/88   Pulse: 98   Temp: 97.5 °F (36.4 °C)   SpO2: 98%   Weight: 120 kg (264 lb 6.4 oz)   Height: 172.7 cm (68\")       Physical Exam:   General Appearance:    Alert, cooperative, in no acute distress, wound clean dry without infection   Abdomen:     no masses, no organomegaly, soft non-tender, non-distended, no guarding, wounds are well healed   Chest:      Clear to ausculation       Assessment / Plan:    1. Postoperative visit        I did discuss the situation with the patient today in the office and they have done well from their recent excision of pilonidal cyst, I don't think that the patient needs any further intervention and I need to see them back only if they have further problems. Pathology report was reviewed with the patient in the office.    Electronically signed by Yoni Palacios MD  06/03/20                      "

## 2021-04-14 ENCOUNTER — OFFICE VISIT (OUTPATIENT)
Dept: SURGERY | Facility: CLINIC | Age: 21
End: 2021-04-14

## 2021-04-14 VITALS
HEIGHT: 68 IN | OXYGEN SATURATION: 98 % | SYSTOLIC BLOOD PRESSURE: 130 MMHG | BODY MASS INDEX: 40.62 KG/M2 | HEART RATE: 96 BPM | DIASTOLIC BLOOD PRESSURE: 80 MMHG | TEMPERATURE: 97.6 F | WEIGHT: 268 LBS

## 2021-04-14 DIAGNOSIS — R10.11 RIGHT UPPER QUADRANT ABDOMINAL PAIN: Primary | ICD-10-CM

## 2021-04-14 PROCEDURE — 99213 OFFICE O/P EST LOW 20 MIN: CPT | Performed by: SURGERY
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
## (undated) DEVICE — RICH MAJOR PROCEDURE: Brand: MEDLINE INDUSTRIES, INC.

## (undated) DEVICE — NDL HYPO ECLPS SFTY 22G 1 1/2IN

## (undated) DEVICE — CUFF SCD HEMOFORCE SEQ CALF STD MD

## (undated) DEVICE — SYR LUERLOK 30CC

## (undated) DEVICE — SUT ETHLN 3/0 FSLX 30IN 1673H

## (undated) DEVICE — GLV SURG SENSICARE W/ALOE PF LF 8.5 STRL

## (undated) DEVICE — FLEXIBLE YANKAUER,MEDIUM TIP, NO VACUUM CONTROL: Brand: ARGYLE